# Patient Record
Sex: FEMALE | Race: WHITE | NOT HISPANIC OR LATINO | Employment: UNEMPLOYED | URBAN - NONMETROPOLITAN AREA
[De-identification: names, ages, dates, MRNs, and addresses within clinical notes are randomized per-mention and may not be internally consistent; named-entity substitution may affect disease eponyms.]

---

## 2022-03-08 ENCOUNTER — HOSPITAL ENCOUNTER (OUTPATIENT)
Dept: RADIOLOGY | Facility: HOSPITAL | Age: 57
Discharge: HOME | End: 2022-03-08
Attending: NURSE PRACTITIONER
Payer: COMMERCIAL

## 2022-03-08 DIAGNOSIS — R05.9 COUGH: ICD-10-CM

## 2022-03-08 DIAGNOSIS — I10 HYPERTENSION: Primary | ICD-10-CM

## 2022-03-08 PROCEDURE — 71046 X-RAY EXAM CHEST 2 VIEWS: CPT | Mod: TC

## 2024-11-06 ENCOUNTER — HOSPITAL ENCOUNTER (EMERGENCY)
Facility: HOSPITAL | Age: 59
Discharge: ANOTHER HOSPITAL | End: 2024-11-06

## 2024-11-06 VITALS
TEMPERATURE: 98 F | SYSTOLIC BLOOD PRESSURE: 108 MMHG | BODY MASS INDEX: 21.26 KG/M2 | OXYGEN SATURATION: 97 % | DIASTOLIC BLOOD PRESSURE: 63 MMHG | WEIGHT: 120 LBS | HEIGHT: 63 IN | RESPIRATION RATE: 18 BRPM | HEART RATE: 84 BPM

## 2024-11-06 DIAGNOSIS — R11.0 NAUSEA: ICD-10-CM

## 2024-11-06 DIAGNOSIS — R10.9 INTRACTABLE ABDOMINAL PAIN: Primary | ICD-10-CM

## 2024-11-06 LAB
ALBUMIN SERPL BCP-MCNC: 2.7 G/DL (ref 3.5–5)
ALBUMIN/GLOB SERPL: 0.5 {RATIO}
ALP SERPL-CCNC: 94 U/L (ref 50–130)
ALT SERPL W P-5'-P-CCNC: 13 U/L (ref 13–56)
ANION GAP SERPL CALCULATED.3IONS-SCNC: 9 MMOL/L (ref 7–16)
ANISOCYTOSIS BLD QL SMEAR: ABNORMAL
AST SERPL W P-5'-P-CCNC: 16 U/L (ref 15–37)
BACTERIA #/AREA URNS HPF: ABNORMAL /HPF
BASOPHILS # BLD AUTO: 0.01 K/UL (ref 0–0.2)
BASOPHILS NFR BLD AUTO: 0.1 % (ref 0–1)
BILIRUB SERPL-MCNC: 1 MG/DL (ref ?–1.2)
BILIRUB UR QL STRIP: ABNORMAL
BUN SERPL-MCNC: 18 MG/DL (ref 7–18)
BUN/CREAT SERPL: 16 (ref 6–20)
CALCIUM SERPL-MCNC: 9.1 MG/DL (ref 8.5–10.1)
CHLORIDE SERPL-SCNC: 93 MMOL/L (ref 98–107)
CLARITY UR: CLEAR
CO2 SERPL-SCNC: 34 MMOL/L (ref 21–32)
COLOR UR: ABNORMAL
CREAT SERPL-MCNC: 1.13 MG/DL (ref 0.55–1.02)
DIFFERENTIAL METHOD BLD: ABNORMAL
EGFR (NO RACE VARIABLE) (RUSH/TITUS): 56 ML/MIN/1.73M2
EOSINOPHIL # BLD AUTO: 0 K/UL (ref 0–0.5)
EOSINOPHIL NFR BLD AUTO: 0 % (ref 1–4)
ERYTHROCYTE [DISTWIDTH] IN BLOOD BY AUTOMATED COUNT: 17.9 % (ref 11.5–14.5)
GLOBULIN SER-MCNC: 5.3 G/DL (ref 2–4)
GLUCOSE SERPL-MCNC: 130 MG/DL (ref 74–106)
GLUCOSE UR STRIP-MCNC: NEGATIVE MG/DL
HCT VFR BLD AUTO: 33.8 % (ref 38–47)
HGB BLD-MCNC: 10.4 G/DL (ref 12–16)
HYALINE CASTS #/AREA URNS LPF: ABNORMAL /LPF
HYPOCHROMIA BLD QL SMEAR: ABNORMAL
KETONES UR STRIP-SCNC: 15 MG/DL
LACTATE SERPL-SCNC: 1.2 MMOL/L (ref 0.4–2)
LEUKOCYTE ESTERASE UR QL STRIP: NEGATIVE
LIPASE SERPL-CCNC: 21 U/L (ref 16–77)
LYMPHOCYTES # BLD AUTO: 0.64 K/UL (ref 1–4.8)
LYMPHOCYTES NFR BLD AUTO: 4.7 % (ref 27–41)
LYMPHOCYTES NFR BLD MANUAL: 9 % (ref 27–41)
MCH RBC QN AUTO: 22.7 PG (ref 27–31)
MCHC RBC AUTO-ENTMCNC: 30.8 G/DL (ref 32–36)
MCV RBC AUTO: 73.8 FL (ref 80–96)
MONOCYTES # BLD AUTO: 0.86 K/UL (ref 0–0.8)
MONOCYTES NFR BLD AUTO: 6.3 % (ref 2–6)
MONOCYTES NFR BLD MANUAL: 2 % (ref 2–6)
MPC BLD CALC-MCNC: 9.3 FL (ref 9.4–12.4)
NEUTROPHILS # BLD AUTO: 12.07 K/UL (ref 1.8–7.7)
NEUTROPHILS NFR BLD AUTO: 88.9 % (ref 53–65)
NEUTS BAND NFR BLD MANUAL: 14 % (ref 1–5)
NEUTS SEG NFR BLD MANUAL: 75 % (ref 50–62)
NITRITE UR QL STRIP: NEGATIVE
NRBC BLD MANUAL-RTO: ABNORMAL %
PH UR STRIP: 5.5 PH UNITS
PLATELET # BLD AUTO: 370 K/UL (ref 150–400)
POIKILOCYTOSIS BLD QL SMEAR: ABNORMAL
POTASSIUM SERPL-SCNC: 3.1 MMOL/L (ref 3.5–5.1)
PROT SERPL-MCNC: 8 G/DL (ref 6.4–8.2)
PROT UR QL STRIP: ABNORMAL
RBC # BLD AUTO: 4.58 M/UL (ref 4.2–5.4)
RBC # UR STRIP: ABNORMAL /UL
RBC #/AREA URNS HPF: ABNORMAL /HPF
SODIUM SERPL-SCNC: 133 MMOL/L (ref 136–145)
SP GR UR STRIP: 1.02
SQUAMOUS #/AREA URNS LPF: ABNORMAL /LPF
UROBILINOGEN UR STRIP-ACNC: 4 MG/DL
WBC # BLD AUTO: 13.58 K/UL (ref 4.5–11)
WBC #/AREA URNS HPF: ABNORMAL /HPF

## 2024-11-06 PROCEDURE — 85025 COMPLETE CBC W/AUTO DIFF WBC: CPT | Performed by: NURSE PRACTITIONER

## 2024-11-06 PROCEDURE — 63600175 PHARM REV CODE 636 W HCPCS: Mod: JZ,TB | Performed by: NURSE PRACTITIONER

## 2024-11-06 PROCEDURE — 25500020 PHARM REV CODE 255: Performed by: NURSE PRACTITIONER

## 2024-11-06 PROCEDURE — 36415 COLL VENOUS BLD VENIPUNCTURE: CPT | Performed by: NURSE PRACTITIONER

## 2024-11-06 PROCEDURE — 96375 TX/PRO/DX INJ NEW DRUG ADDON: CPT

## 2024-11-06 PROCEDURE — 99285 EMERGENCY DEPT VISIT HI MDM: CPT | Mod: 25

## 2024-11-06 PROCEDURE — 83605 ASSAY OF LACTIC ACID: CPT | Performed by: NURSE PRACTITIONER

## 2024-11-06 PROCEDURE — 80053 COMPREHEN METABOLIC PANEL: CPT | Performed by: NURSE PRACTITIONER

## 2024-11-06 PROCEDURE — 96365 THER/PROPH/DIAG IV INF INIT: CPT

## 2024-11-06 PROCEDURE — 96376 TX/PRO/DX INJ SAME DRUG ADON: CPT

## 2024-11-06 PROCEDURE — 83690 ASSAY OF LIPASE: CPT | Performed by: NURSE PRACTITIONER

## 2024-11-06 PROCEDURE — 81003 URINALYSIS AUTO W/O SCOPE: CPT | Performed by: NURSE PRACTITIONER

## 2024-11-06 RX ORDER — MORPHINE SULFATE 4 MG/ML
2 INJECTION, SOLUTION INTRAMUSCULAR; INTRAVENOUS
Status: COMPLETED | OUTPATIENT
Start: 2024-11-06 | End: 2024-11-06

## 2024-11-06 RX ORDER — IOPAMIDOL 755 MG/ML
100 INJECTION, SOLUTION INTRAVASCULAR
Status: COMPLETED | OUTPATIENT
Start: 2024-11-06 | End: 2024-11-06

## 2024-11-06 RX ORDER — METRONIDAZOLE 500 MG/100ML
500 INJECTION, SOLUTION INTRAVENOUS
Status: COMPLETED | OUTPATIENT
Start: 2024-11-06 | End: 2024-11-06

## 2024-11-06 RX ORDER — LEVOFLOXACIN 5 MG/ML
500 INJECTION, SOLUTION INTRAVENOUS
Status: DISCONTINUED | OUTPATIENT
Start: 2024-11-06 | End: 2024-11-06 | Stop reason: HOSPADM

## 2024-11-06 RX ORDER — METOPROLOL SUCCINATE 50 MG/1
TABLET, EXTENDED RELEASE ORAL
COMMUNITY

## 2024-11-06 RX ORDER — HYDROCHLOROTHIAZIDE 25 MG/1
TABLET ORAL
COMMUNITY

## 2024-11-06 RX ORDER — ONDANSETRON HYDROCHLORIDE 2 MG/ML
4 INJECTION, SOLUTION INTRAVENOUS
Status: DISCONTINUED | OUTPATIENT
Start: 2024-11-06 | End: 2024-11-06 | Stop reason: HOSPADM

## 2024-11-06 RX ORDER — ONDANSETRON HYDROCHLORIDE 2 MG/ML
4 INJECTION, SOLUTION INTRAVENOUS
Status: COMPLETED | OUTPATIENT
Start: 2024-11-06 | End: 2024-11-06

## 2024-11-06 RX ORDER — OMEPRAZOLE 40 MG/1
CAPSULE, DELAYED RELEASE ORAL
COMMUNITY

## 2024-11-06 RX ORDER — ONDANSETRON 4 MG/1
4 TABLET, ORALLY DISINTEGRATING ORAL
Status: DISCONTINUED | OUTPATIENT
Start: 2024-11-06 | End: 2024-11-06

## 2024-11-06 RX ADMIN — LEVOFLOXACIN 500 MG: 5 INJECTION, SOLUTION INTRAVENOUS at 07:11

## 2024-11-06 RX ADMIN — MORPHINE SULFATE 2 MG: 4 INJECTION, SOLUTION INTRAMUSCULAR; INTRAVENOUS at 06:11

## 2024-11-06 RX ADMIN — MORPHINE SULFATE 2 MG: 4 INJECTION, SOLUTION INTRAMUSCULAR; INTRAVENOUS at 11:11

## 2024-11-06 RX ADMIN — IOPAMIDOL 100 ML: 755 INJECTION, SOLUTION INTRAVENOUS at 12:11

## 2024-11-06 RX ADMIN — MORPHINE SULFATE 2 MG: 4 INJECTION, SOLUTION INTRAMUSCULAR; INTRAVENOUS at 02:11

## 2024-11-06 RX ADMIN — ONDANSETRON 4 MG: 2 INJECTION INTRAMUSCULAR; INTRAVENOUS at 03:11

## 2024-11-06 RX ADMIN — ONDANSETRON 4 MG: 2 INJECTION INTRAMUSCULAR; INTRAVENOUS at 11:11

## 2024-11-06 RX ADMIN — METRONIDAZOLE 500 MG: 500 INJECTION, SOLUTION INTRAVENOUS at 07:11

## 2024-11-06 NOTE — ED PROVIDER NOTES
Encounter Date: 11/6/2024       History     Chief Complaint   Patient presents with    Abdominal Pain    Vomiting     Onset over a week ago and saw PCP, pain has increased since 4 am this AM with nausea, vomiting     58 y/o female is sent to the ED [facility] with complaint of lower abdominal pain and dysuria that started a week ago, but has progressively become worse. Was treated for UTI with Bactrim DS, but symptoms have progressively become worse. Reports having severe lower abdominal pain, dysuria, nausea with vomiting. Rates pain 10/10. Has not taken anything for pain. Last bm was small hard this morning. Denies fever.   Past surgical history: cholecystectomy, bilateral tubal ligation and lysis of adhesions. Had last meal of yogurt last night, last drink was sip of water/Gatorade early this morning.    The history is provided by the patient.     Review of patient's allergies indicates:   Allergen Reactions    Penicillanic sulfone bl beta-lactamase inhibitors Itching     Past Medical History:   Diagnosis Date    GERD (gastroesophageal reflux disease)     Hypertension      Past Surgical History:   Procedure Laterality Date    BILATERAL TUBAL LIGATION      LYSIS OF ADHESIONS       Family History   Problem Relation Name Age of Onset    Dementia Mother      Heart disease Father       Social History     Tobacco Use    Smoking status: Every Day     Current packs/day: 2.00     Average packs/day: 2.0 packs/day for 44.8 years (89.7 ttl pk-yrs)     Types: Cigarettes     Start date: 1980     Passive exposure: Never    Smokeless tobacco: Never   Substance Use Topics    Alcohol use: Never    Drug use: Never     Review of Systems   Constitutional:  Positive for appetite change (decreased), chills and diaphoresis. Negative for activity change (decreased) and fever.   HENT: Negative.     Eyes: Negative.    Respiratory:  Negative for cough and shortness of breath.    Cardiovascular: Negative.    Gastrointestinal:   Positive for abdominal pain, constipation, nausea and vomiting. Negative for diarrhea.   Genitourinary:  Positive for dysuria and frequency. Negative for hematuria.   Musculoskeletal: Negative.    Skin: Negative.    Neurological: Negative.    Hematological: Negative.    Psychiatric/Behavioral: Negative.         Physical Exam     Initial Vitals [11/06/24 1101]   BP Pulse Resp Temp SpO2   128/78 93 19 98 °F (36.7 °C) 97 %      MAP       --         Physical Exam    Nursing note and vitals reviewed.  Constitutional: Vital signs are normal. She appears well-developed and well-nourished. She is cooperative.  Non-toxic appearance. She does not have a sickly appearance. She appears ill.   HENT:   Head: Normocephalic and atraumatic.   Right Ear: External ear normal.   Left Ear: External ear normal. Mouth/Throat: Mucous membranes are dry.   Eyes: Conjunctivae are normal.   Neck: Neck supple.   Normal range of motion.  Cardiovascular:  Normal rate, regular rhythm, normal heart sounds and normal pulses.           Pulmonary/Chest: Effort normal and breath sounds normal. No respiratory distress.   Abdominal: Bowel sounds are decreased. There is generalized abdominal tenderness.   No right CVA tenderness.  No left CVA tenderness. There is guarding.   Musculoskeletal:      Cervical back: Normal range of motion and neck supple.     Neurological: She is alert and oriented to person, place, and time.   Skin: Skin is warm, dry and intact. Capillary refill takes 2 to 3 seconds. No rash noted. There is pallor.   Psychiatric: She has a normal mood and affect. Her speech is normal and behavior is normal. Judgment normal.         Medical Screening Exam   See Full Note    ED Course   Procedures  Labs Reviewed   COMPREHENSIVE METABOLIC PANEL - Abnormal       Result Value    Sodium 133 (*)     Potassium 3.1 (*)     Chloride 93 (*)     CO2 34 (*)     Anion Gap 9      Glucose 130 (*)     BUN 18      Creatinine 1.13 (*)     BUN/Creatinine Ratio  16      Calcium 9.1      Total Protein 8.0      Albumin 2.7 (*)     Globulin 5.3 (*)     A/G Ratio 0.5      Bilirubin, Total 1.0      Alk Phos 94      ALT 13      AST 16      eGFR 56 (*)    URINALYSIS, REFLEX TO URINE CULTURE - Abnormal    Color, UA Orange      Clarity, UA Clear      pH, UA 5.5      Leukocytes, UA Negative      Nitrites, UA Negative      Protein, UA Trace (*)     Glucose, UA Negative      Ketones, UA 15 (*)     Urobilinogen, UA 4.0 (*)     Bilirubin, UA Large (*)     Blood, UA Moderate (*)     Specific Gravity, UA 1.025     CBC WITH DIFFERENTIAL - Abnormal    WBC 13.58 (*)     RBC 4.58      Hemoglobin 10.4 (*)     Hematocrit 33.8 (*)     MCV 73.8 (*)     MCH 22.7 (*)     MCHC 30.8 (*)     RDW 17.9 (*)     Platelet Count 370      MPV 9.3 (*)     Neutrophils % 88.9 (*)     Lymphocytes % 4.7 (*)     Neutrophils, Abs 12.07 (*)     Lymphocytes, Absolute 0.64 (*)     Diff Type Manual      Monocytes % 6.3 (*)     Eosinophils % 0.0 (*)     Basophils % 0.1      Monocytes, Absolute 0.86 (*)     Eosinophils, Absolute 0.00      Basophils, Absolute 0.01     MANUAL DIFFERENTIAL - Abnormal    Segmented Neutrophils, Man % 75 (*)     Bands, Man % 14 (*)     Lymphocytes, Man % 9 (*)     Monocytes, Man % 2      nRBC, Manual        Anisocytosis 1+      Poikilocytosis 1+      Hypochromic Few     URINALYSIS, MICROSCOPIC - Abnormal    WBC, UA 0-5      RBC, UA 5-10 (*)     Bacteria, UA Occasional (*)     Squamous Epithelial Cells, UA Occasional (*)     Hyaline Casts, UA 2-5 (*)    LACTIC ACID, PLASMA - Normal    Lactic Acid 1.2     LIPASE - Normal    Lipase 21     CBC W/ AUTO DIFFERENTIAL    Narrative:     The following orders were created for panel order CBC auto differential.  Procedure                               Abnormality         Status                     ---------                               -----------         ------                     CBC with Differential[[id number]]       Abnormal            Final result                Manual Differential[[id number]]         Abnormal            Final result                 Please view results for these tests on the individual orders.          Imaging Results               CT Abdomen Pelvis With IV Contrast NO Oral Contrast (Final result)  Result time 11/06/24 13:42:32      Final result by Delfin Page MD (11/06/24 13:42:32)                   Impression:      This report was flagged in Epic as abnormal.    1. Marked wall thickening involving the mid to distal sigmoid colon noting there are a few diverticula in the region.  Findings may reflect diverticulitis however given the degree of wall thickening, other nonspecific colitis or malignancy remain considerations.  Along the anterior aspect of the affected segment, there is concern for developing fluid collection.  On coronal imaging, this collection may have punctate foci of air in the lumen with contained perforation a differential consideration.  Follow-up colonoscopy is recommended to exclude malignancy.  2. Large amount of stool upstream of the affected portion of the sigmoid colon suggests constipation on the basis of distal obstruction.  3. Findings suggest hepatic steatosis, correlation with LFTs recommended.  4. Please see above for several additional findings.      Electronically signed by: Delfin Page MD  Date:    11/06/2024  Time:    13:42               Narrative:    EXAMINATION:  CT ABDOMEN PELVIS WITH IV CONTRAST    CLINICAL HISTORY:  Abdominal pain, acute, nonlocalized;    TECHNIQUE:  Low dose axial images, sagittal and coronal reformations were obtained from the lung bases to the pubic symphysis following the IV administration of 100 mL of Omnipaque 350 .  Oral contrast was not given.    COMPARISON:  None.    FINDINGS:  Images of the lower thorax are remarkable for bilateral dependent atelectasis.  There is a 2 mm pulmonary nodule versus granuloma within the left lower lobe.    Allowing for motion artifact,  the liver is hypoattenuating, suggesting steatosis, correlation with LFTs recommended.  The spleen, pancreas and adrenal glands are grossly unremarkable noting splenic granuloma.  The gallbladder is surgically absent, there is mild biliary prominence status post cholecystectomy.  The stomach is decompressed without wall thickening.  The portal vein, splenic vein, SMV, celiac axis and SMA all are patent.  No significant abdominal lymphadenopathy.    The kidneys enhance symmetrically without hydronephrosis or nephrolithiasis.  There is a subcentimeter low attenuating lesion within the interpolar region of the right kidney, too small for characterization.  The bilateral ureters are unable to be followed in their entirety to the urinary bladder, no definite calculi seen or secondary findings to suggest obstructive uropathy.  The urinary bladder is nondistended.  The uterus and adnexa are grossly unremarkable.    There is strand-like fluid/induration within the pelvis.  There is diffuse wall thickening involving the mid sigmoid colon in the region of several diverticula concerning for diverticulitis.  There is tethering of the sigmoid colon anteriorly, at the site, there may be a developing fluid collection measuring 1.5 cm.  There is a questionable punctate focus of air within this collection on coronal imaging.  There is a large amount of stool upstream of the region of sigmoid wall thickening.  The terminal ileum is unremarkable.  The appendix is unremarkable.  The small bowel is grossly unremarkable noting some reactive wall thickening involving a few small bowel loops within the deep pelvis adjacent to the affected sigmoid colonic segment.  There are several scattered shotty periaortic, pericaval, and mesenteric lymph nodes.  There is atherosclerotic calcification of the aorta and its branches.    There is osteopenia.  There are degenerative changes of the bilateral sacroiliac joints and spine.  There is superior  endplate height loss of L5, likely on the basis of Schmorl's node noting grade 1 anterolisthesis of L5 on S1.  No significant inguinal lymphadenopathy.                                       Medications   ondansetron injection 4 mg (4 mg Intravenous Not Given 11/6/24 1700)   levoFLOXacin 500 mg/100 mL IVPB 500 mg (has no administration in time range)   metronidazole IVPB 500 mg (500 mg Intravenous New Bag 11/6/24 1900)   morphine injection 2 mg (2 mg Intravenous Given 11/6/24 1136)   ondansetron injection 4 mg (4 mg Intravenous Given 11/6/24 1136)   iopamidoL (ISOVUE-370) injection 100 mL (100 mLs Intravenous Given 11/6/24 1248)   morphine injection 2 mg (2 mg Intravenous Given 11/6/24 1400)   ondansetron injection 4 mg (4 mg Intravenous Given 11/6/24 1521)   morphine injection 2 mg (2 mg Intravenous Given 11/6/24 1805)     Medical Decision Making  60 y/o female is sent to the ED from [facility] with complaint of lower abdominal pain and dysuria that started a week ago, but has progressively become worse. Was treated for UTI with Bactrim DS, but symptoms have progressively become worse. Reports having severe lower abdominal pain, dysuria, nausea with vomiting. Rates pain 10/10. Has not taken anything for pain.  Last bm was small hard this morning. Has tried Mag Citrate, but no results. Denies fever.   Past surgical history: cholecystectomy, bilateral tubal ligation and lysis of adhesions. Had last meal of yogurt last night, last drink was sip of water/Gatorade early this morning.    Amount and/or Complexity of Data Reviewed  Labs: ordered.     Details: Labs Reviewed  COMPREHENSIVE METABOLIC PANEL - Abnormal     Sodium                        133 (*)                Potassium                     3.1 (*)                Chloride                      93 (*)                 CO2                           34 (*)                 Anion Gap                     9                      Glucose                       130 (*)                 BUN                           18                     Creatinine                    1.13 (*)               BUN/Creatinine Ratio          16                     Calcium                       9.1                    Total Protein                 8.0                    Albumin                       2.7 (*)                Globulin                      5.3 (*)                A/G Ratio                     0.5                    Bilirubin, Total              1.0                    Alk Phos                      94                     ALT                           13                     AST                           16                     eGFR                          56 (*)              URINALYSIS, REFLEX TO URINE CULTURE - Abnormal     Color, UA                     [place]                 Clarity, UA                   Clear                  pH, UA                        5.5                    Leukocytes, UA                Negative                Nitrites, UA                  Negative                Protein, UA                   Trace (*)               Glucose, UA                   Negative                Ketones, UA                   15 (*)                 Urobilinogen, UA              4.0 (*)                Bilirubin, UA                 Large (*)               Blood, UA                     Moderate (*)               Specific [place], UA          1.025               CBC WITH DIFFERENTIAL - Abnormal     WBC                           13.58 (*)               RBC                           4.58                   Hemoglobin                    10.4 (*)               Hematocrit                    33.8 (*)               MCV                           73.8 (*)               MCH                           22.7 (*)               MCHC                          30.8 (*)               RDW                           17.9 (*)               Platelet Count                370                    MPV                           9.3 (*)                 Neutrophils %                 88.9 (*)               Lymphocytes %                 4.7 (*)                Neutrophils, Abs              12.07 (*)               Lymphocytes, Absolute         0.64 (*)               Diff Type                     Manual                 Monocytes %                   6.3 (*)                Eosinophils %                 0.0 (*)                Basophils %                   0.1                    Monocytes, Absolute           0.86 (*)               Eosinophils, Absolute         0.00                   Basophils, Absolute           0.01                MANUAL DIFFERENTIAL - Abnormal     Segmented Neutrophils, Man %   75 (*)                 Bands, Man %                  14 (*)                 Lymphocytes, Man %            9 (*)                  Monocytes, Man %              2                      nRBC, Manual                                         Anisocytosis                  1+                     Poikilocytosis                1+                     Hypochromic                   Few                 URINALYSIS, MICROSCOPIC - Abnormal     WBC, UA                       0-5                    RBC, UA                       5-10 (*)               Bacteria, UA                  Occasional (*)               Squamous Epithelial Cells, UA   Occasional (*)               Hyaline Casts, UA             2-5 (*)             LACTIC ACID, PLASMA - Normal     Lactic Acid                   1.2                 LIPASE - Normal     Lipase                        21                  CBC W/ AUTO DIFFERENTIAL   Radiology: ordered. Decision-making details documented in ED Course.     Details: CT abdomen pelvis with contrast  Discussion of management or test interpretation with external provider(s): Dr. Santana approved for CT abdomen/pelvis with contrast.     Admission MDM  I discussed the patient presentation labs, ekg, X-rays, CT findings with Dr. Ventura through [facility]. Dr. Ventura recommended for patient  to be transferred where IR is available. Possible diverticulitis with developing fluid collection, concerned about possible contained perforation.  -Morphine 2 mg IV x 2  -Zofran 4 mg IV x 2  -Levaquin 500 mg IV x 1  -Flagyl 500 mg IV x 1  Pain down to 4/10, patient continues to have pain  -Morphine 2 mg IV x 1  [facility] order placed for transfer. [facility] surgeon declined. Recommended to for transfer where IR is available.  Dr. Porras accepted patient to [facility] ED.          Risk  Prescription drug management.                                      Clinical Impression:   Final diagnoses:  [R10.9] Intractable abdominal pain (Primary)  [R11.0] Nausea        ED Disposition Condition    Transfer to Another Facility Stable                Loly Bonilla, MATTHEW  11/06/24 1919       Loly Bonilla FNP  11/06/24 2015

## 2024-11-07 NOTE — ED NOTES
Transferred to [facility] ED via [facility].  Patient is awake an alert.    Levaquin infusing in left hand with out redness or edema.  O2 at 3L per nasal cannula.